# Patient Record
Sex: FEMALE | URBAN - METROPOLITAN AREA
[De-identification: names, ages, dates, MRNs, and addresses within clinical notes are randomized per-mention and may not be internally consistent; named-entity substitution may affect disease eponyms.]

---

## 2021-05-27 ENCOUNTER — OFFICE VISIT (OUTPATIENT)
Dept: URBAN - METROPOLITAN AREA CLINIC 29 | Facility: CLINIC | Age: 41
End: 2021-05-27

## 2021-05-27 PROBLEM — 14760008 CONSTIPATION: Status: ACTIVE | Noted: 2021-05-27

## 2021-05-27 PROBLEM — 442076002 EARLY SATIETY: Status: ACTIVE | Noted: 2021-05-27

## 2021-05-27 PROBLEM — 238136002 MORBID OBESITY: Status: ACTIVE | Noted: 2021-05-27

## 2021-05-27 PROBLEM — 271832001 FLATULENCE, ERUCTATION AND GAS PAIN: Status: ACTIVE | Noted: 2021-05-27

## 2021-05-27 PROBLEM — 275978004 SCREENING FOR MALIGNANT NEOPLASM OF COLON: Status: ACTIVE | Noted: 2021-05-27

## 2021-05-27 PROBLEM — 79922009 EPIGASTRIC PAIN: Status: ACTIVE | Noted: 2021-05-27

## 2021-05-29 ENCOUNTER — LAB OUTSIDE AN ENCOUNTER (OUTPATIENT)
Dept: URBAN - METROPOLITAN AREA CLINIC 121 | Facility: CLINIC | Age: 41
End: 2021-05-29

## 2021-05-29 LAB — ZZ-GE-UNK: NOT DETECTED

## 2021-06-28 ENCOUNTER — OFFICE VISIT (OUTPATIENT)
Dept: URBAN - METROPOLITAN AREA CLINIC 29 | Facility: CLINIC | Age: 41
End: 2021-06-28

## 2022-04-30 ENCOUNTER — TELEPHONE ENCOUNTER (OUTPATIENT)
Dept: URBAN - METROPOLITAN AREA CLINIC 121 | Facility: CLINIC | Age: 42
End: 2022-04-30

## 2022-05-01 ENCOUNTER — TELEPHONE ENCOUNTER (OUTPATIENT)
Dept: URBAN - METROPOLITAN AREA CLINIC 121 | Facility: CLINIC | Age: 42
End: 2022-05-01

## 2022-05-01 RX ORDER — LINACLOTIDE 145 UG/1
1 CAPSULE PO QAM 30 MINS BEFORE EATING CAPSULE, GELATIN COATED ORAL
Status: ACTIVE | COMMUNITY
Start: 2021-05-27

## 2022-05-01 RX ORDER — OMEPRAZOLE 40 MG/1
1 CAPSULE PO QAM CAPSULE, DELAYED RELEASE ORAL
Status: ACTIVE | COMMUNITY
Start: 2021-05-27

## 2022-12-06 ENCOUNTER — OFFICE VISIT (OUTPATIENT)
Dept: URBAN - METROPOLITAN AREA CLINIC 118 | Facility: CLINIC | Age: 42
End: 2022-12-06
Payer: COMMERCIAL

## 2022-12-06 ENCOUNTER — DASHBOARD ENCOUNTERS (OUTPATIENT)
Age: 42
End: 2022-12-06

## 2022-12-06 ENCOUNTER — LAB OUTSIDE AN ENCOUNTER (OUTPATIENT)
Dept: URBAN - METROPOLITAN AREA CLINIC 118 | Facility: CLINIC | Age: 42
End: 2022-12-06

## 2022-12-06 VITALS
HEART RATE: 57 BPM | SYSTOLIC BLOOD PRESSURE: 112 MMHG | DIASTOLIC BLOOD PRESSURE: 65 MMHG | HEIGHT: 57 IN | TEMPERATURE: 97.5 F | WEIGHT: 230 LBS | BODY MASS INDEX: 49.62 KG/M2

## 2022-12-06 DIAGNOSIS — Z90.49 HISTORY OF BOWEL RESECTION: ICD-10-CM

## 2022-12-06 DIAGNOSIS — R11.0 NAUSEA: ICD-10-CM

## 2022-12-06 DIAGNOSIS — R10.84 ABDOMINAL PAIN, GENERALIZED: ICD-10-CM

## 2022-12-06 PROCEDURE — 99204 OFFICE O/P NEW MOD 45 MIN: CPT | Performed by: INTERNAL MEDICINE

## 2022-12-06 RX ORDER — LINACLOTIDE 145 UG/1
1 CAPSULE PO QAM 30 MINS BEFORE EATING CAPSULE, GELATIN COATED ORAL
Status: ACTIVE | COMMUNITY
Start: 2021-05-27

## 2022-12-06 RX ORDER — OMEPRAZOLE 40 MG/1
1 CAPSULE PO QAM CAPSULE, DELAYED RELEASE ORAL
Status: ACTIVE | COMMUNITY
Start: 2021-05-27

## 2022-12-06 RX ORDER — TRAMADOL HYDROCHLORIDE 50 MG/1
1 TABLET AS NEEDED TABLET, FILM COATED ORAL ONCE A DAY
Qty: 20 | Refills: 0 | OUTPATIENT
Start: 2022-12-06 | End: 2022-12-26

## 2022-12-06 NOTE — HPI-TODAY'S VISIT:
Ms. Chopra is a 43 y/o F who is here for severe abd pain which began 11/26. States the pain will be so severe that she cannot move and it feels like a squeezing sensation in her periumbilical region. Reports pregnancy with IUD in 2014. States her IUD perforated her small bowel and she had an emergency small bowel resection at [facility]. On 11/26 pt states that pain was unbearable and she went to the ED. In the ED she was found to have a UTI. CT scan revealed Mid abd small bowel distention likely d/t obstructionor stemosis at anastomosis in the RLQ.   Pt denies vomiting, but endorses severe nausea. Reports flatus and passing stool. Denies constipation, but endorses loose stool.

## 2022-12-12 ENCOUNTER — TELEPHONE ENCOUNTER (OUTPATIENT)
Dept: URBAN - METROPOLITAN AREA CLINIC 118 | Facility: CLINIC | Age: 42
End: 2022-12-12

## 2022-12-13 PROBLEM — 442461003: Status: ACTIVE | Noted: 2022-12-06

## 2022-12-13 PROBLEM — 422587007 NAUSEA: Status: ACTIVE | Noted: 2021-05-27

## 2022-12-21 ENCOUNTER — TELEPHONE ENCOUNTER (OUTPATIENT)
Dept: URBAN - METROPOLITAN AREA CLINIC 118 | Facility: CLINIC | Age: 42
End: 2022-12-21